# Patient Record
Sex: MALE | Race: WHITE | NOT HISPANIC OR LATINO | URBAN - METROPOLITAN AREA
[De-identification: names, ages, dates, MRNs, and addresses within clinical notes are randomized per-mention and may not be internally consistent; named-entity substitution may affect disease eponyms.]

---

## 2025-05-28 ENCOUNTER — OFFICE VISIT (OUTPATIENT)
Dept: URGENT CARE | Age: 30
End: 2025-05-28
Payer: COMMERCIAL

## 2025-05-28 VITALS
OXYGEN SATURATION: 98 % | DIASTOLIC BLOOD PRESSURE: 106 MMHG | TEMPERATURE: 97.4 F | BODY MASS INDEX: 32.93 KG/M2 | SYSTOLIC BLOOD PRESSURE: 146 MMHG | HEART RATE: 79 BPM | HEIGHT: 70 IN | WEIGHT: 230 LBS
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
## 2025-05-28 RX ORDER — FEXOFENADINE HCL 180 MG/1
TABLET ORAL
COMMUNITY

## 2025-05-28 RX ORDER — CEPHALEXIN 500 MG/1
500 CAPSULE ORAL 3 TIMES DAILY
Qty: 21 CAPSULE | Refills: 0 | Status: SHIPPED | OUTPATIENT
Start: 2025-05-28 | End: 2025-06-04

## 2025-05-28 ASSESSMENT — ENCOUNTER SYMPTOMS
HEADACHES: 0
BACK PAIN: 0
FEVER: 0
CHILLS: 0

## 2025-05-28 NOTE — PROGRESS NOTES
"Subjective   Patient ID: Deejay Sheikh is a 30 y.o. male. They present today with a chief complaint of rodent bite (Pt was bit by a rodent, possibly a mole and is worried for an illness it may cause).    History of Present Illness  Patient is 31 y/o M c/o rodent bite earlier today. Patient states he thought his cat had killed animal and was throwing it away. Animal bit his left 2nd distal finger. Patient states there was a small amount of blood.           Past Medical History  Allergies as of 05/28/2025    (No Known Allergies)       Prescriptions Prior to Admission[1]     Medical History[2]    Surgical History[3]         Review of Systems  Review of Systems   Constitutional:  Negative for chills and fever.   Musculoskeletal:  Negative for back pain.   Neurological:  Negative for headaches.                                  Objective    Vitals:    05/28/25 1423   BP: (!) 146/106   Pulse: 79   Temp: 36.3 °C (97.4 °F)   SpO2: 98%   Weight: 104 kg (230 lb)   Height: 1.778 m (5' 10\")     No LMP for male patient.    Physical Exam    Procedures    Point of Care Test & Imaging Results from this visit  No results found for this visit on 05/28/25.   Imaging  No results found.    Cardiology, Vascular, and Other Imaging  No other imaging results found for the past 2 days      Diagnostic study results (if any) were reviewed by Quin Plasencia MD.    Assessment/Plan   Allergies, medications, history, and pertinent labs/EKGs/Imaging reviewed by Quin Plasencia MD.     Medical Decision Making  ***    Orders and Diagnoses  There are no diagnoses linked to this encounter.    Medical Admin Record      Patient disposition: Home    Electronically signed by Quin Plasencia MD  2:51 PM           [1] (Not in a hospital admission)  [2] No past medical history on file.  [3] No past surgical history on file.    " billing purposes only please.  Please note that the components of this chart are limited as the patient was interviewed and evaluated by the initial provider, and not by me.  The content of this chart was inputted by  David Vallecillo/for nursing staff.    Marvel Lopez DO         [1] (Not in a hospital admission)  [2] No past medical history on file.  [3] No past surgical history on file.